# Patient Record
Sex: FEMALE | HISPANIC OR LATINO | Employment: FULL TIME | RURAL
[De-identification: names, ages, dates, MRNs, and addresses within clinical notes are randomized per-mention and may not be internally consistent; named-entity substitution may affect disease eponyms.]

---

## 2023-11-22 ENCOUNTER — OFFICE VISIT (OUTPATIENT)
Dept: FAMILY MEDICINE CLINIC | Facility: CLINIC | Age: 18
End: 2023-11-22
Payer: COMMERCIAL

## 2023-11-22 VITALS
BODY MASS INDEX: 40.97 KG/M2 | DIASTOLIC BLOOD PRESSURE: 82 MMHG | WEIGHT: 217 LBS | HEART RATE: 99 BPM | SYSTOLIC BLOOD PRESSURE: 128 MMHG | OXYGEN SATURATION: 100 % | HEIGHT: 61 IN

## 2023-11-22 DIAGNOSIS — G47.8 NON-RESTORATIVE SLEEP: Primary | ICD-10-CM

## 2023-11-22 DIAGNOSIS — R63.5 WEIGHT GAIN: ICD-10-CM

## 2023-11-22 DIAGNOSIS — R40.0 DAYTIME SOMNOLENCE: ICD-10-CM

## 2023-11-22 DIAGNOSIS — E66.01 MORBID (SEVERE) OBESITY DUE TO EXCESS CALORIES: ICD-10-CM

## 2023-11-22 DIAGNOSIS — R06.83 SNORING: ICD-10-CM

## 2023-11-22 PROCEDURE — 99203 OFFICE O/P NEW LOW 30 MIN: CPT | Performed by: INTERNAL MEDICINE

## 2023-11-22 NOTE — PROGRESS NOTES
Office Note     Name: Yessenia Gill    : 2005     MRN: [id number]     Chief Complaint  Establish Care (Pt is here to establish care today. She is here with her . )    Subjective     History of Present Illness:  Yessenia Gill is a 18 y.o. female who presents today for:    Est Care.    Not on birth control. Has never been on contraceptive. Has never actively tried to get pregnant.      Has monthly regular periods lasting 1 week.    Also concerned about weight.  Previously ha been able to lose a few pounds with calorie counting and walking but regained it and has not had success losing again.    Is also tried all the time, sleep is not restful, sleeps al the time, partner says she snore.      Review of Systems:   Review of Systems    Past Medical History: History reviewed. No pertinent past medical history.    Past Surgical History: History reviewed. No pertinent surgical history.    Family History:   Family History   Problem Relation Age of Onset    Cancer Mother     Diabetes Father        Social History:   Social History     Socioeconomic History    Marital status: Significant Other   Tobacco Use    Smoking status: Never     Passive exposure: Never    Smokeless tobacco: Never   Vaping Use    Vaping Use: Never used   Substance and Sexual Activity    Alcohol use: Not Currently    Drug use: Not Currently    Sexual activity: Yes     Partners: Male       Immunizations:   Immunization History   Administered Date(s) Administered    COVID-19 (PFIZER) Purple Cap Monovalent 2021, 2021    DTaP 2003, 2003, 2003, 2004, 2007    Flublok 18+yrs 10/19/2023    HPV Quadrivalent 2014, 2015    Hep A, 2 Dose 2006, 2007    Hep B / HiB 2003, 2003    Hep B, Adolescent or Pediatric 2003    Hepatitis B 2 Dose Vaccine Heplisav-B 10/27/2023    Hib (PRP-T) 2003, 2004    IPV 2003, 2003, 2003, 2004, 2004,  "08/27/2007    MMR 08/27/2004    MMRV 08/28/2007    Meningococcal Conjugate 05/30/2014    PEDS-Pneumococcal Conjugate (PCV7) 2003, 2003, 2003, 08/27/2004    Tdap 05/30/2014    Varicella 08/27/2004        Medications:   No current outpatient medications on file.    Allergies:   No Known Allergies    Objective     Vital Signs  /82   Pulse 99   Ht 154.9 cm (61\")   Wt 98.4 kg (217 lb)   SpO2 100%   BMI 41.00 kg/m²   Estimated body mass index is 41 kg/m² as calculated from the following:    Height as of this encounter: 154.9 cm (61\").    Weight as of this encounter: 98.4 kg (217 lb).    Class 3 Severe Obesity (BMI >=40). Obesity-related health conditions include the following: none. Obesity is newly identified. BMI is is above average; BMI management plan is completed. We discussed portion control and increasing exercise.      Physical Exam  Vitals and nursing note reviewed.   Constitutional:       Appearance: Normal appearance.   Cardiovascular:      Rate and Rhythm: Normal rate and regular rhythm.      Heart sounds: No murmur heard.     No friction rub. No gallop.   Pulmonary:      Effort: Pulmonary effort is normal.      Breath sounds: Normal breath sounds. No wheezing, rhonchi or rales.   Neurological:      Mental Status: She is alert.            Procedures     Assessment and Plan     1. Non-restorative sleep    - Lipid Panel  - CBC & Differential  - TSH Rfx On Abnormal To Free T4  - Hemoglobin A1c  - Home Sleep Study; Future    2. Snoring    - Lipid Panel  - CBC & Differential  - TSH Rfx On Abnormal To Free T4  - Hemoglobin A1c  - Home Sleep Study; Future    3. Daytime somnolence    - Lipid Panel  - CBC & Differential  - TSH Rfx On Abnormal To Free T4  - Hemoglobin A1c  - Home Sleep Study; Future    4. Weight gain    - Lipid Panel  - CBC & Differential  - TSH Rfx On Abnormal To Free T4  - Hemoglobin A1c  - Home Sleep Study; Future    5. Morbid (severe) obesity due to excess calories    - " Lipid Panel  - CBC & Differential  - TSH Rfx On Abnormal To Free T4  - Hemoglobin A1c  - Home Sleep Study; Future       Follow Up  Return if symptoms worsen or fail to improve.    Patient was advised to call the office or seek medical care if  any issues discussed during this visit worsen or persist or if new concerns arise        MD REJI [facility]ust[facility]  [address] [facility] [address] [phone]

## 2023-11-23 LAB
BASOPHILS # BLD AUTO: 0 X10E3/UL (ref 0–0.2)
BASOPHILS NFR BLD AUTO: 0 %
CHOLEST SERPL-MCNC: 158 MG/DL (ref 100–199)
EOSINOPHIL # BLD AUTO: 0.1 X10E3/UL (ref 0–0.4)
EOSINOPHIL NFR BLD AUTO: 1 %
ERYTHROCYTE [DISTWIDTH] IN BLOOD BY AUTOMATED COUNT: 13.9 % (ref 11.7–15.4)
HBA1C MFR BLD: 5.4 % (ref 4.8–5.6)
HCT VFR BLD AUTO: 38.9 % (ref 34–46.6)
HDLC SERPL-MCNC: 46 MG/DL
HGB BLD-MCNC: 12.9 G/DL (ref 11.1–15.9)
IMM GRANULOCYTES # BLD AUTO: 0 X10E3/UL (ref 0–0.1)
IMM GRANULOCYTES NFR BLD AUTO: 0 %
LDLC SERPL CALC-MCNC: 92 MG/DL (ref 0–99)
LYMPHOCYTES # BLD AUTO: 2.4 X10E3/UL (ref 0.7–3.1)
LYMPHOCYTES NFR BLD AUTO: 35 %
MCH RBC QN AUTO: 27.6 PG (ref 26.6–33)
MCHC RBC AUTO-ENTMCNC: 33.2 G/DL (ref 31.5–35.7)
MCV RBC AUTO: 83 FL (ref 79–97)
MONOCYTES # BLD AUTO: 0.5 X10E3/UL (ref 0.1–0.9)
MONOCYTES NFR BLD AUTO: 8 %
NEUTROPHILS # BLD AUTO: 3.9 X10E3/UL (ref 1.4–7)
NEUTROPHILS NFR BLD AUTO: 56 %
PLATELET # BLD AUTO: 282 X10E3/UL (ref 150–450)
RBC # BLD AUTO: 4.67 X10E6/UL (ref 3.77–5.28)
TRIGL SERPL-MCNC: 112 MG/DL (ref 0–149)
TSH SERPL DL<=0.005 MIU/L-ACNC: 1.51 UIU/ML (ref 0.45–4.5)
VLDLC SERPL CALC-MCNC: 20 MG/DL (ref 5–40)
WBC # BLD AUTO: 6.9 X10E3/UL (ref 3.4–10.8)

## 2023-11-27 DIAGNOSIS — E66.01 MORBID (SEVERE) OBESITY DUE TO EXCESS CALORIES: Primary | ICD-10-CM

## 2023-12-07 ENCOUNTER — OFFICE VISIT (OUTPATIENT)
Dept: FAMILY MEDICINE CLINIC | Facility: CLINIC | Age: 18
End: 2023-12-07
Payer: COMMERCIAL

## 2023-12-07 VITALS
DIASTOLIC BLOOD PRESSURE: 74 MMHG | HEART RATE: 89 BPM | HEIGHT: 61 IN | SYSTOLIC BLOOD PRESSURE: 110 MMHG | OXYGEN SATURATION: 98 % | BODY MASS INDEX: 39.65 KG/M2 | WEIGHT: 210 LBS

## 2023-12-07 DIAGNOSIS — R05.1 ACUTE COUGH: Primary | ICD-10-CM

## 2023-12-07 LAB
EXPIRATION DATE: NORMAL
FLUAV AG UPPER RESP QL IA.RAPID: NOT DETECTED
FLUBV AG UPPER RESP QL IA.RAPID: NOT DETECTED
INTERNAL CONTROL: NORMAL
Lab: NORMAL
SARS-COV-2 AG UPPER RESP QL IA.RAPID: NOT DETECTED

## 2023-12-07 NOTE — PROGRESS NOTES
"Chief Complaint  Headache (Headache for 3 days coughing started yesterday )    Subjective          Yessenia Gill presents to [facility]  History of Present Illness  Pt had had intermittent headaches x 3 days. She has had dry cough but denies fever, chills, or myalgias. She denies sinus pressure or congestion. She denies N/V/D.   Headache      Objective   Vital Signs:   /74   Pulse 89   Ht 154.9 cm (61\")   Wt 95.3 kg (210 lb)   SpO2 98%   BMI 39.68 kg/m²     Body mass index is 39.68 kg/m².    Review of Systems   Constitutional:  Negative for fatigue and fever.   Respiratory:  Positive for cough. Negative for shortness of breath.    Cardiovascular:  Negative for chest pain, palpitations and leg swelling.   Neurological:  Negative for syncope.   Psychiatric/Behavioral:  The patient is not nervous/anxious.         No current outpatient medications on file.      Allergies: Patient has no known allergies.    Physical Exam  Constitutional:       Appearance: Normal appearance.   HENT:      Right Ear: Tympanic membrane and ear canal normal.      Left Ear: Tympanic membrane and ear canal normal.      Nose: Nose normal.      Mouth/Throat:      Pharynx: No posterior oropharyngeal erythema.   Eyes:      Extraocular Movements: Extraocular movements intact.      Conjunctiva/sclera: Conjunctivae normal.      Pupils: Pupils are equal, round, and reactive to light.   Cardiovascular:      Rate and Rhythm: Normal rate and regular rhythm.      Heart sounds: Normal heart sounds.   Pulmonary:      Effort: Pulmonary effort is normal. No accessory muscle usage.      Breath sounds: Normal breath sounds.   Lymphadenopathy:      Cervical: No cervical adenopathy.   Skin:     General: Skin is warm and dry.   Neurological:      General: No focal deficit present.      Mental Status: She is alert.   Psychiatric:         Mood and Affect: Mood normal.         Behavior: Behavior normal.         Thought " Content: Thought content normal.         Judgment: Judgment normal.          Result Review :                   Assessment and Plan    Diagnoses and all orders for this visit:    1. Acute cough (Primary)  Comments:  Increase fluids. Tylenol/Motrin PRN. Robitussin PRN cough. RTC if not improving in 1 week.  Orders:  -     POCT SARS-CoV-2 + Flu Antigen QI                Follow Up   Return in about 1 week (around 12/14/2023) for if not improving or sooner if symptoms worsen.  Patient was given instructions and counseling regarding her condition or for health maintenance advice. Please see specific information pulled into the AVS if appropriate.     She Castañeda, APRN

## 2023-12-08 DIAGNOSIS — N94.6 DYSMENORRHEA: Primary | ICD-10-CM

## 2023-12-12 DIAGNOSIS — N94.6 DYSMENORRHEA: Primary | ICD-10-CM

## 2024-01-03 DIAGNOSIS — Z00.00 EXAMINATION: ICD-10-CM

## 2024-01-03 DIAGNOSIS — Z00.00 EXAMINATION: Primary | ICD-10-CM

## 2024-01-03 PROCEDURE — 36415 COLL VENOUS BLD VENIPUNCTURE: CPT | Performed by: PHYSICIAN ASSISTANT

## 2024-01-04 LAB — HBV SURFACE AG SERPL QL IA: NEGATIVE

## 2024-01-10 ENCOUNTER — OFFICE VISIT (OUTPATIENT)
Dept: FAMILY MEDICINE CLINIC | Facility: CLINIC | Age: 19
End: 2024-01-10
Payer: COMMERCIAL

## 2024-01-10 VITALS
DIASTOLIC BLOOD PRESSURE: 86 MMHG | HEART RATE: 112 BPM | SYSTOLIC BLOOD PRESSURE: 132 MMHG | TEMPERATURE: 98.8 F | OXYGEN SATURATION: 98 %

## 2024-01-10 DIAGNOSIS — R05.9 COUGH, UNSPECIFIED TYPE: ICD-10-CM

## 2024-01-10 DIAGNOSIS — J10.1 INFLUENZA A: Primary | ICD-10-CM

## 2024-01-10 LAB
EXPIRATION DATE: ABNORMAL
FLUAV AG UPPER RESP QL IA.RAPID: DETECTED
FLUBV AG UPPER RESP QL IA.RAPID: NOT DETECTED
INTERNAL CONTROL: ABNORMAL
Lab: ABNORMAL
SARS-COV-2 AG UPPER RESP QL IA.RAPID: NOT DETECTED

## 2024-01-10 PROCEDURE — 99213 OFFICE O/P EST LOW 20 MIN: CPT | Performed by: INTERNAL MEDICINE

## 2024-01-10 PROCEDURE — 87428 SARSCOV & INF VIR A&B AG IA: CPT | Performed by: INTERNAL MEDICINE

## 2024-01-10 RX ORDER — BENZONATATE 100 MG/1
100 CAPSULE ORAL 3 TIMES DAILY PRN
Qty: 21 CAPSULE | Refills: 0 | Status: SHIPPED | OUTPATIENT
Start: 2024-01-10

## 2024-01-10 NOTE — PROGRESS NOTES
Office Note     Name: Yessenia Gill    : 2005     MRN: [id number]     Chief Complaint  Cough (Pt is here for cough, congestion, drainage and sinus pressure. )    Subjective     History of Present Illness:  Yessenia Gill is a 18 y.o. female who presents today for:    Cough cngestion sinus pressure and sore trhat, then about 4-5 days ago started feeling much worse.  Has had chills      Review of Systems:   Review of Systems   Gastrointestinal:  Negative for diarrhea and vomiting.       Past Medical History: History reviewed. No pertinent past medical history.    Past Surgical History: History reviewed. No pertinent surgical history.    Family History:   Family History   Problem Relation Age of Onset    Cancer Mother     Diabetes Father        Social History:   Social History     Socioeconomic History    Marital status: Significant Other   Tobacco Use    Smoking status: Never     Passive exposure: Never    Smokeless tobacco: Never   Vaping Use    Vaping Use: Never used   Substance and Sexual Activity    Alcohol use: Not Currently    Drug use: Not Currently    Sexual activity: Yes     Partners: Male       Immunizations:   Immunization History   Administered Date(s) Administered    COVID-19 (PFIZER) Purple Cap Monovalent 2021, 2021    DTaP 2003, 2003, 2003, 2004, 2007    Flublok 18+yrs 10/19/2023    HPV Quadrivalent 2014, 2015    Hep A, 2 Dose 2006, 2007    Hep B / HiB 2003, 2003    Hep B, Adolescent or Pediatric 2003    Hepatitis B 2 Dose Vaccine Heplisav-B 10/27/2023    Hib (PRP-T) 2003, 2004    IPV 2003, 2003, 2003, 2004, 2004, 2007    MMR 2004    MMRV 2007    Meningococcal Conjugate 2014    PEDS-Pneumococcal Conjugate (PCV7) 2003, 2003, 2003, 2004    Tdap 2014    Varicella 2004        Medications:     Current Outpatient  "Medications:     benzonatate (Tessalon Perles) 100 MG capsule, Take 1 capsule by mouth 3 (Three) Times a Day As Needed for Cough., Disp: 21 capsule, Rfl: 0    Allergies:   No Known Allergies    Objective     Vital Signs  /86   Pulse 112   Temp 98.8 °F (37.1 °C) (Oral)   SpO2 98%   Estimated body mass index is 39.68 kg/m² as calculated from the following:    Height as of 12/7/23: 154.9 cm (61\").    Weight as of 12/7/23: 95.3 kg (210 lb).            Physical Exam  Vitals and nursing note reviewed.   Constitutional:       General: She is not in acute distress.     Appearance: Normal appearance.   HENT:      Right Ear: Tympanic membrane normal.      Left Ear: Tympanic membrane normal.      Nose: Rhinorrhea present.      Mouth/Throat:      Pharynx: No oropharyngeal exudate or posterior oropharyngeal erythema.   Eyes:      Conjunctiva/sclera: Conjunctivae normal.   Cardiovascular:      Rate and Rhythm: Normal rate and regular rhythm.      Heart sounds: Normal heart sounds.   Pulmonary:      Effort: Pulmonary effort is normal.      Breath sounds: Normal breath sounds. No wheezing, rhonchi or rales.   Neurological:      Mental Status: She is alert.            Procedures     Assessment and Plan     1. Influenza A  Discussed influenza diagnosis today, including common symptoms.  Recommended increased p.o. fluid intake.  Recommended Ibuprofen and Tylenol for fever or body aches and to avoid aspirin and salicylate products.  Patient or caregiver should call the office, return to clinic or seek further care if symptoms worsen or persist. Cannot return to school/work until fever has been resolved for 24-48 hours. Risks and benefits of tamiflu treatment discussed, including potential for shorter time to resolution and possible decrease in complications from influenza, medication not prescribed due to time of diagnosis being outside therapeutic window. Patient is advised to contact the office or be re-evaluated if symptoms " worsen or fail to improve.     Start benzonotate 100 MG po [id number] PRN for cough    2. Cough, unspecified type    - POCT SARS-CoV-2 + Flu Antigen QI       Follow Up  Return if symptoms worsen or fail to improve.    Patient was advised to call the office or seek medical care if  any issues discussed during this visit worsen or persist or if new concerns arise        MD REJI Huston [facility]  [address] [place] [id number] [phone]

## 2024-02-09 ENCOUNTER — CLINICAL SUPPORT (OUTPATIENT)
Dept: FAMILY MEDICINE CLINIC | Facility: CLINIC | Age: 19
End: 2024-02-09
Payer: COMMERCIAL

## 2024-02-09 DIAGNOSIS — Z23 ENCOUNTER FOR IMMUNIZATION: Primary | ICD-10-CM

## 2024-03-18 ENCOUNTER — OFFICE VISIT (OUTPATIENT)
Dept: FAMILY MEDICINE CLINIC | Facility: CLINIC | Age: 19
End: 2024-03-18
Payer: COMMERCIAL

## 2024-03-18 VITALS
BODY MASS INDEX: 39.68 KG/M2 | HEART RATE: 99 BPM | SYSTOLIC BLOOD PRESSURE: 120 MMHG | HEIGHT: 61 IN | DIASTOLIC BLOOD PRESSURE: 70 MMHG | TEMPERATURE: 99.1 F | OXYGEN SATURATION: 99 %

## 2024-03-18 DIAGNOSIS — U07.1 COVID-19: Primary | ICD-10-CM

## 2024-03-18 DIAGNOSIS — J02.9 SORE THROAT: ICD-10-CM

## 2024-03-18 LAB
EXPIRATION DATE: ABNORMAL
FLUAV AG UPPER RESP QL IA.RAPID: NOT DETECTED
FLUBV AG UPPER RESP QL IA.RAPID: NOT DETECTED
INTERNAL CONTROL: ABNORMAL
Lab: ABNORMAL
SARS-COV-2 AG UPPER RESP QL IA.RAPID: DETECTED

## 2024-03-18 PROCEDURE — 87428 SARSCOV & INF VIR A&B AG IA: CPT | Performed by: NURSE PRACTITIONER

## 2024-03-18 PROCEDURE — 99213 OFFICE O/P EST LOW 20 MIN: CPT | Performed by: NURSE PRACTITIONER

## 2024-03-18 RX ORDER — BROMPHENIRAMINE MALEATE, PSEUDOEPHEDRINE HYDROCHLORIDE, AND DEXTROMETHORPHAN HYDROBROMIDE 2; 30; 10 MG/5ML; MG/5ML; MG/5ML
5 SYRUP ORAL 4 TIMES DAILY PRN
Qty: 120 ML | Refills: 0 | Status: SHIPPED | OUTPATIENT
Start: 2024-03-18

## 2024-03-18 NOTE — PROGRESS NOTES
"Chief Complaint  Cough (Cough, runny nose, itchy nose, itchy eyes, sinus pressure since Friday afternoon )    Subjective          Yessenia Gill presents to [facility]  History of Present Illness  Pt has had cough, congestion, ear pressure, and chills since Friday.   Cough  Associated symptoms include chills and ear pain. Pertinent negatives include no chest pain, fever or shortness of breath.       Objective   Vital Signs:   /70   Pulse 99   Temp 99.1 °F (37.3 °C) (Oral)   Ht 154.9 cm (61\")   SpO2 99%   BMI 39.68 kg/m²     Body mass index is 39.68 kg/m².    Review of Systems   Constitutional:  Positive for chills. Negative for fatigue and fever.   HENT:  Positive for congestion and ear pain.    Respiratory:  Positive for cough. Negative for shortness of breath.    Cardiovascular:  Negative for chest pain, palpitations and leg swelling.   Neurological:  Negative for syncope.   Psychiatric/Behavioral:  The patient is not nervous/anxious.           Current Outpatient Medications:     brompheniramine-pseudoephedrine-DM 30-2-10 MG/5ML syrup, Take 5 mL by mouth 4 (Four) Times a Day As Needed for Cough or Congestion., Disp: 120 mL, Rfl: 0      Allergies: Patient has no known allergies.    Physical Exam  Constitutional:       Appearance: Normal appearance.   HENT:      Head: Normocephalic.   Eyes:      Conjunctiva/sclera: Conjunctivae normal.      Pupils: Pupils are equal, round, and reactive to light.   Cardiovascular:      Rate and Rhythm: Normal rate and regular rhythm.      Heart sounds: Normal heart sounds.   Pulmonary:      Effort: Pulmonary effort is normal.      Breath sounds: Normal breath sounds.   Abdominal:      Tenderness: There is no abdominal tenderness.   Musculoskeletal:         General: Normal range of motion.   Skin:     General: Skin is warm and dry.      Capillary Refill: Capillary refill takes less than 2 seconds.   Neurological:      General: No focal deficit " present.      Mental Status: She is alert and oriented to person, place, and time.   Psychiatric:         Mood and Affect: Mood normal.         Behavior: Behavior normal.         Thought Content: Thought content normal.         Judgment: Judgment normal.          Result Review :                   Assessment and Plan    Diagnoses and all orders for this visit:    1. COVID-19 (Primary)  Comments:  Increase fluids. Tylenol/Motrin PRN. Bromfed DM PRN. RTC if not improving in 1 week.  Orders:  -     brompheniramine-pseudoephedrine-DM 30-2-10 MG/5ML syrup; Take 5 mL by mouth 4 (Four) Times a Day As Needed for Cough or Congestion.  Dispense: 120 mL; Refill: 0    2. Sore throat  -     POCT SARS-CoV-2 Antigen QI + Flu                Follow Up   Return in about 1 week (around 3/25/2024) for if not improving or sooner if symptoms worsen.  Patient was given instructions and counseling regarding her condition or for health maintenance advice. Please see specific information pulled into the AVS if appropriate.     LEONARDO Cates

## 2024-04-03 ENCOUNTER — OFFICE VISIT (OUTPATIENT)
Dept: FAMILY MEDICINE CLINIC | Facility: CLINIC | Age: 19
End: 2024-04-03
Payer: COMMERCIAL

## 2024-04-03 VITALS
BODY MASS INDEX: 44.56 KG/M2 | SYSTOLIC BLOOD PRESSURE: 138 MMHG | WEIGHT: 236 LBS | HEART RATE: 118 BPM | HEIGHT: 61 IN | OXYGEN SATURATION: 98 % | DIASTOLIC BLOOD PRESSURE: 88 MMHG

## 2024-04-03 DIAGNOSIS — Z00.00 ANNUAL PHYSICAL EXAM: Primary | ICD-10-CM

## 2024-04-03 DIAGNOSIS — Z11.59 ENCOUNTER FOR HEPATITIS C SCREENING TEST FOR LOW RISK PATIENT: ICD-10-CM

## 2024-04-03 DIAGNOSIS — F41.1 GENERALIZED ANXIETY DISORDER WITH PANIC ATTACKS: Chronic | ICD-10-CM

## 2024-04-03 DIAGNOSIS — Z13.21 ENCOUNTER FOR VITAMIN DEFICIENCY SCREENING: ICD-10-CM

## 2024-04-03 DIAGNOSIS — Z13.1 SCREENING FOR DIABETES MELLITUS (DM): ICD-10-CM

## 2024-04-03 DIAGNOSIS — Z13.220 SCREENING FOR LIPID DISORDERS: ICD-10-CM

## 2024-04-03 DIAGNOSIS — F41.0 GENERALIZED ANXIETY DISORDER WITH PANIC ATTACKS: Chronic | ICD-10-CM

## 2024-04-03 RX ORDER — HYDROXYZINE HYDROCHLORIDE 25 MG/1
25 TABLET, FILM COATED ORAL 3 TIMES DAILY PRN
Qty: 30 TABLET | Refills: 1 | Status: SHIPPED | OUTPATIENT
Start: 2024-04-03

## 2024-04-03 RX ORDER — ESCITALOPRAM OXALATE 10 MG/1
10 TABLET ORAL DAILY
Qty: 30 TABLET | Refills: 0 | Status: SHIPPED | OUTPATIENT
Start: 2024-04-03
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
## 2024-04-03 NOTE — ASSESSMENT & PLAN NOTE
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
## 2024-04-04 ENCOUNTER — LAB (OUTPATIENT)
Dept: FAMILY MEDICINE CLINIC | Facility: CLINIC | Age: 19
End: 2024-04-04
Payer: COMMERCIAL

## 2024-04-05 LAB
25(OH)D3+25(OH)D2 SERPL-MCNC: 7.3 NG/ML (ref 30–100)
ALBUMIN SERPL-MCNC: 4.1 G/DL (ref 4–5)
ALBUMIN/GLOB SERPL: 1.4 {RATIO} (ref 1.2–2.2)
ALP SERPL-CCNC: 106 IU/L (ref 44–121)
ALT SERPL-CCNC: 21 IU/L (ref 0–32)
AST SERPL-CCNC: 17 IU/L (ref 0–40)
BASOPHILS # BLD AUTO: 0 X10E3/UL (ref 0–0.2)
BASOPHILS NFR BLD AUTO: 1 %
BILIRUB SERPL-MCNC: 0.2 MG/DL (ref 0–1.2)
BUN SERPL-MCNC: 11 MG/DL (ref 6–20)
BUN/CREAT SERPL: 20 (ref 9–23)
CALCIUM SERPL-MCNC: 9.1 MG/DL (ref 8.7–10.2)
CHLORIDE SERPL-SCNC: 103 MMOL/L (ref 96–106)
CHOLEST SERPL-MCNC: 158 MG/DL (ref 100–199)
CO2 SERPL-SCNC: 21 MMOL/L (ref 20–29)
CREAT SERPL-MCNC: 0.55 MG/DL (ref 0.57–1)
EGFRCR SERPLBLD CKD-EPI 2021: 134 ML/MIN/1.73
EOSINOPHIL # BLD AUTO: 0.1 X10E3/UL (ref 0–0.4)
EOSINOPHIL NFR BLD AUTO: 2 %
ERYTHROCYTE [DISTWIDTH] IN BLOOD BY AUTOMATED COUNT: 14.1 % (ref 11.7–15.4)
FOLATE SERPL-MCNC: 8.6 NG/ML
GLOBULIN SER CALC-MCNC: 3 G/DL (ref 1.5–4.5)
GLUCOSE SERPL-MCNC: 96 MG/DL (ref 70–99)
HBA1C MFR BLD: 5.7 % (ref 4.8–5.6)
HCT VFR BLD AUTO: 36.6 % (ref 34–46.6)
HCV IGG SERPL QL IA: NON REACTIVE
HDLC SERPL-MCNC: 48 MG/DL
HGB BLD-MCNC: 11.9 G/DL (ref 11.1–15.9)
IMM GRANULOCYTES # BLD AUTO: 0 X10E3/UL (ref 0–0.1)
IMM GRANULOCYTES NFR BLD AUTO: 0 %
LDLC SERPL CALC-MCNC: 95 MG/DL (ref 0–99)
LYMPHOCYTES # BLD AUTO: 2.3 X10E3/UL (ref 0.7–3.1)
LYMPHOCYTES NFR BLD AUTO: 38 %
MCH RBC QN AUTO: 25.9 PG (ref 26.6–33)
MCHC RBC AUTO-ENTMCNC: 32.5 G/DL (ref 31.5–35.7)
MCV RBC AUTO: 80 FL (ref 79–97)
MONOCYTES # BLD AUTO: 0.4 X10E3/UL (ref 0.1–0.9)
MONOCYTES NFR BLD AUTO: 6 %
NEUTROPHILS # BLD AUTO: 3.2 X10E3/UL (ref 1.4–7)
NEUTROPHILS NFR BLD AUTO: 53 %
PLATELET # BLD AUTO: 282 X10E3/UL (ref 150–450)
POTASSIUM SERPL-SCNC: 4.3 MMOL/L (ref 3.5–5.2)
PROT SERPL-MCNC: 7.1 G/DL (ref 6–8.5)
RBC # BLD AUTO: 4.6 X10E6/UL (ref 3.77–5.28)
SODIUM SERPL-SCNC: 137 MMOL/L (ref 134–144)
T4 FREE SERPL-MCNC: 1.35 NG/DL (ref 0.82–1.77)
TRIGL SERPL-MCNC: 76 MG/DL (ref 0–149)
TSH SERPL DL<=0.005 MIU/L-ACNC: 1.3 UIU/ML (ref 0.45–4.5)
VIT B12 SERPL-MCNC: 361 PG/ML (ref 232–1245)
VLDLC SERPL CALC-MCNC: 15 MG/DL (ref 5–40)
WBC # BLD AUTO: 6.1 X10E3/UL (ref 3.4–10.8)

## 2024-04-07 DIAGNOSIS — E55.9 VITAMIN D DEFICIENCY: Primary | ICD-10-CM

## 2024-04-12 ENCOUNTER — PATIENT ROUNDING (BHMG ONLY) (OUTPATIENT)
Dept: OBSTETRICS AND GYNECOLOGY | Facility: CLINIC | Age: 19
End: 2024-04-12
Payer: COMMERCIAL

## 2024-04-12 ENCOUNTER — OFFICE VISIT (OUTPATIENT)
Dept: OBSTETRICS AND GYNECOLOGY | Facility: CLINIC | Age: 19
End: 2024-04-12
Payer: COMMERCIAL

## 2024-04-12 VITALS
WEIGHT: 236 LBS | HEIGHT: 61 IN | BODY MASS INDEX: 44.56 KG/M2 | DIASTOLIC BLOOD PRESSURE: 82 MMHG | SYSTOLIC BLOOD PRESSURE: 110 MMHG

## 2024-04-12 DIAGNOSIS — Z30.09 ENCOUNTER FOR OTHER GENERAL COUNSELING OR ADVICE ON CONTRACEPTION: ICD-10-CM

## 2024-04-12 DIAGNOSIS — E66.01 MORBID OBESITY WITH BMI OF 40.0-44.9, ADULT: ICD-10-CM

## 2024-04-12 DIAGNOSIS — Z01.419 WOMEN'S ANNUAL ROUTINE GYNECOLOGICAL EXAMINATION: ICD-10-CM

## 2024-04-12 DIAGNOSIS — N94.6 DYSMENORRHEA: ICD-10-CM

## 2024-04-12 DIAGNOSIS — N92.0 MENORRHAGIA WITH REGULAR CYCLE: Primary | ICD-10-CM

## 2024-04-12 DIAGNOSIS — Z01.419 PAP TEST, AS PART OF ROUTINE GYNECOLOGICAL EXAMINATION: ICD-10-CM

## 2024-04-12 NOTE — PROGRESS NOTES
Gynecologic Annual Exam Note        Gynecologic Exam (New patient) and Menorrhagia        Subjective     HPI  Yessenia Gill is a 19 y.o.  female who presents for annual well woman exam as a new patient. . Patient's last menstrual period was 2024 (exact date). Her periods occur every 28 days, lasting 6 days.  The flow is moderate to heavy. She reports dysmenorrhea is moderate occurring first 1-2 days of flow. Marital Status: engaged.  She is sexually active. She has not had new partners.. STD testing recommendations have been explained to the patient and she does desire STD testing.    The patient would like to discuss the following complaints today: heavy cycles.  She is not interested in hormonal birth control.    Additional OB/GYN History   contraceptive methods: None  Desires to:  wants to discuss  contraception if necessary  Thromboembolic Disease: none  History of migraines: no  Age of menarche: 9    History of STD: no    Last Pap : N/A.        Gardasil status: had 2  Family history of uterine, colon, breast, or ovarian cancer: yes - maternal grandmother had breast cancer and mom had bladder cancer  Performs monthly Self-Breast Exam: no  Exercises Regularly:no  Feelings of Anxiety or Depression: yes - anxiety  Tobacco Usage?: No       Current Outpatient Medications:     escitalopram (Lexapro) 10 MG tablet, Take 1 tablet by mouth Daily., Disp: 30 tablet, Rfl: 0    hydrOXYzine (ATARAX) 25 MG tablet, Take 1 tablet by mouth 3 (Three) Times a Day As Needed for Anxiety., Disp: 30 tablet, Rfl: 1    vitamin D3 (Vitamin D) 125 MCG (5000 UT) capsule capsule, Take 1 capsule by mouth Daily., Disp: 30 capsule, Rfl: 11     Patient denies the need for medication refills today.    OB History          0    Para   0    Term   0       0    AB   0    Living   0         SAB   0    IAB   0    Ectopic   0    Molar   0    Multiple   0    Live Births   0                Health Maintenance   Topic Date  "Due    Annual Gynecologic Pelvic and Breast Exam  Never done    COVID-19 Vaccine (3 - 2023-24 season) 09/01/2023    CHLAMYDIA SCREENING  Never done    PAP SMEAR  Never done    TDAP/TD VACCINES (2 - Td or Tdap) 05/30/2024    INFLUENZA VACCINE  08/01/2024    BMI FOLLOWUP  11/27/2024    ANNUAL PHYSICAL  04/03/2025    HEPATITIS C SCREENING  Completed    HPV VACCINES  Completed    Pneumococcal Vaccine 0-64  Aged Out    MENINGOCOCCAL VACCINE  Aged Out       Past Medical History:   Diagnosis Date    Anxiety         History reviewed. No pertinent surgical history.    The additional following portions of the patient's history were reviewed and updated as appropriate: .    Review of Systems   Constitutional: Negative.    HENT: Negative.     Eyes: Negative.    Respiratory: Negative.     Cardiovascular: Negative.    Gastrointestinal: Negative.    Endocrine: Negative.    Genitourinary: Negative.    Musculoskeletal: Negative.    Skin: Negative.    Allergic/Immunologic: Negative.    Neurological: Negative.    Hematological: Negative.    Psychiatric/Behavioral: Negative.           I have reviewed and agree with the HPI, ROS, and historical information as entered above. Peggy Golden, APRN          Objective   /82   Ht 154.9 cm (61\")   Wt 107 kg (236 lb)   LMP 03/27/2024 (Exact Date)   BMI 44.59 kg/m²     Physical Exam  Vitals and nursing note reviewed. Exam conducted with a chaperone present.   Constitutional:       General: She is not in acute distress.     Appearance: Normal appearance. She is well-developed. She is obese. She is not ill-appearing.   Neck:      Thyroid: No thyroid mass or thyromegaly.   Pulmonary:      Effort: Pulmonary effort is normal. No respiratory distress or retractions.   Chest:      Chest wall: No mass.   Breasts:     Right: Normal. No mass, nipple discharge, skin change or tenderness.      Left: Normal. No mass, nipple discharge, skin change or tenderness.   Abdominal:      General: There is no " distension.      Palpations: Abdomen is soft. Abdomen is not rigid. There is no mass.      Tenderness: There is no abdominal tenderness. There is no guarding or rebound.      Hernia: No hernia is present. There is no hernia in the left inguinal area.   Genitourinary:     General: Normal vulva.      Labia:         Right: No rash, tenderness or lesion.         Left: No rash, tenderness or lesion.       Vagina: Normal. No vaginal discharge or lesions.      Cervix: Normal.      Uterus: Normal. Not enlarged, not fixed and not tender.       Adnexa: Right adnexa normal and left adnexa normal.        Right: No mass or tenderness.          Left: No mass or tenderness.        Rectum: No external hemorrhoid.   Musculoskeletal:      Cervical back: No muscular tenderness.   Skin:     General: Skin is warm and dry.   Neurological:      Mental Status: She is alert and oriented to person, place, and time.   Psychiatric:         Mood and Affect: Mood normal.         Behavior: Behavior normal.            Assessment and Plan    Problem List Items Addressed This Visit    None  Visit Diagnoses       Menorrhagia with regular cycle    -  Primary    Relevant Orders    US Non-ob Transvaginal    Dysmenorrhea        Relevant Orders    US Non-ob Transvaginal    Pap test, as part of routine gynecological examination        Relevant Orders    LIQUID-BASED PAP SMEAR WITH HPV GENOTYPING IF ASCUS (ANGELITA,[facility],MAD)    Women's annual routine gynecological examination        Encounter for other general counseling or advice on contraception        Morbid obesity with BMI of 40.0-44.9, adult                GYN annual well woman exam.   Reviewed pap guidelines.   Reviewed monthly self breast exams.  Instructed to call with lumps, pain, or breast discharge.    Reviewed BMI and weight loss as preventative health measures.   Reviewed exercise as a preventative health measures.   Reccommended Flu Vaccine in Fall of each year.  RTC in 1 year or PRN with  problems  Return in about 2 weeks (around 4/26/2024) for US FU.  Reviewed with pt options, including OCPs, patch, Nuvaring, Lysteda, IUDs..  Risks, benefits, side effects, and correct use of each reviewed.  Info given.     Peggy Golden, APRN  04/12/2024

## 2024-04-12 NOTE — PROGRESS NOTES
April 12, 2024    Hello, may I speak with Yessenia Gill?    My name is ELIZABETH      I am  with [facility]   [facility]  [address]  [facility] [address] [phone].    Before we get started may I verify your date of birth? 2005    I am calling to officially welcome you to our practice and ask about your recent visit. Is this a good time to talk? yes    Tell me about your visit with us. What things went well?  EVERYTHING WENT GREAT       We're always looking for ways to make our patients' experiences even better. Do you have recommendations on ways we may improve?  no    Overall were you satisfied with your first visit to our practice? yes       I appreciate you taking the time to speak with me today. Is there anything else I can do for you? no      Thank you, and have a great day.

## 2024-04-18 LAB — REF LAB TEST METHOD: NORMAL

## 2024-04-26 ENCOUNTER — OFFICE VISIT (OUTPATIENT)
Dept: OBSTETRICS AND GYNECOLOGY | Facility: CLINIC | Age: 19
End: 2024-04-26
Payer: COMMERCIAL

## 2024-04-26 ENCOUNTER — OFFICE VISIT (OUTPATIENT)
Dept: FAMILY MEDICINE CLINIC | Facility: CLINIC | Age: 19
End: 2024-04-26
Payer: COMMERCIAL

## 2024-04-26 VITALS
DIASTOLIC BLOOD PRESSURE: 68 MMHG | HEIGHT: 61 IN | SYSTOLIC BLOOD PRESSURE: 124 MMHG | BODY MASS INDEX: 45.12 KG/M2 | WEIGHT: 239 LBS

## 2024-04-26 VITALS
OXYGEN SATURATION: 98 % | WEIGHT: 237 LBS | DIASTOLIC BLOOD PRESSURE: 76 MMHG | SYSTOLIC BLOOD PRESSURE: 124 MMHG | BODY MASS INDEX: 44.75 KG/M2 | HEART RATE: 101 BPM | HEIGHT: 61 IN

## 2024-04-26 DIAGNOSIS — N94.6 DYSMENORRHEA: ICD-10-CM

## 2024-04-26 DIAGNOSIS — F41.1 GENERALIZED ANXIETY DISORDER WITH PANIC ATTACKS: Primary | Chronic | ICD-10-CM

## 2024-04-26 DIAGNOSIS — E66.01 MORBID (SEVERE) OBESITY DUE TO EXCESS CALORIES: ICD-10-CM

## 2024-04-26 DIAGNOSIS — Z32.00 ENCOUNTER FOR PREGNANCY TEST, RESULT UNKNOWN: Primary | ICD-10-CM

## 2024-04-26 DIAGNOSIS — F41.0 GENERALIZED ANXIETY DISORDER WITH PANIC ATTACKS: Primary | Chronic | ICD-10-CM

## 2024-04-26 DIAGNOSIS — Z79.899 CONTROLLED SUBSTANCE AGREEMENT SIGNED: ICD-10-CM

## 2024-04-26 DIAGNOSIS — N92.0 MENORRHAGIA WITH REGULAR CYCLE: ICD-10-CM

## 2024-04-26 LAB
AMPHET+METHAMPHET UR QL: NEGATIVE
AMPHETAMINE INTERNAL CONTROL: NORMAL
AMPHETAMINES UR QL: NEGATIVE
B-HCG UR QL: NEGATIVE
BARBITURATE INTERNAL CONTROL: NORMAL
BARBITURATES UR QL SCN: NEGATIVE
BENZODIAZ UR QL SCN: NEGATIVE
BENZODIAZEPINE INTERNAL CONTROL: NORMAL
BUPRENORPHINE INTERNAL CONTROL: NORMAL
BUPRENORPHINE SERPL-MCNC: NEGATIVE NG/ML
CANNABINOIDS SERPL QL: NEGATIVE
COCAINE INTERNAL CONTROL: NORMAL
COCAINE UR QL: NEGATIVE
EXPIRATION DATE: NORMAL
EXPIRATION DATE: NORMAL
INTERNAL NEGATIVE CONTROL: NORMAL
INTERNAL POSITIVE CONTROL: NORMAL
Lab: NORMAL
Lab: NORMAL
MDMA (ECSTASY) INTERNAL CONTROL: NORMAL
MDMA UR QL SCN: NEGATIVE
METHADONE INTERNAL CONTROL: NORMAL
METHADONE UR QL SCN: NEGATIVE
METHAMPHETAMINE INTERNAL CONTROL: NORMAL
OPIATES INTERNAL CONTROL: NORMAL
OPIATES UR QL: NEGATIVE
OXYCODONE INTERNAL CONTROL: NORMAL
OXYCODONE UR QL SCN: NEGATIVE
PCP UR QL SCN: NEGATIVE
PHENCYCLIDINE INTERNAL CONTROL: NORMAL
THC INTERNAL CONTROL: NORMAL

## 2024-04-26 RX ORDER — PHENTERMINE HYDROCHLORIDE 37.5 MG/1
37.5 CAPSULE ORAL EVERY MORNING
Qty: 30 CAPSULE | Refills: 0 | Status: SHIPPED | OUTPATIENT
Start: 2024-04-26

## 2024-04-26 RX ORDER — NAPROXEN SODIUM 550 MG/1
TABLET ORAL
Qty: 60 TABLET | Refills: 1 | Status: SHIPPED | OUTPATIENT
Start: 2024-04-26

## 2024-04-26 RX ORDER — ESCITALOPRAM OXALATE 10 MG/1
10 TABLET ORAL DAILY
Qty: 90 TABLET | Refills: 1 | Status: SHIPPED | OUTPATIENT
Start: 2024-04-26

## 2024-04-26 NOTE — ASSESSMENT & PLAN NOTE
Psychological condition is improving with treatment.  Continue current treatment regimen.  Regular aerobic exercise.  Psychological condition  will be reassessed in 6 months.    Patient reports she is doing very well, anxiety is well-controlled since starting Lexapro. She is very exctied, has seen significant improvements in her daily life and job performance. She denies need to increase dose, wants to continue at current dose. Refills provided

## 2024-04-26 NOTE — PROGRESS NOTES
"    Office Note     Name: Yessenia Gill    : 2005     MRN: [id number]     Chief Complaint  Anxiety (Med recheck )    Subjective     History of Present Illness:  Yessenia Gill is a 19 y.o. female who presents today for follow-up on anxiety medication. She would like to discuss and initiate weigh tloss medication as well.       Objective     Past Medical History:   Diagnosis Date    Anxiety      History reviewed. No pertinent surgical history.  Family History   Problem Relation Age of Onset    Diabetes Father     Cancer Mother        Vital Signs  /76   Pulse 101   Ht 154.9 cm (61\")   Wt 108 kg (237 lb)   SpO2 98%   BMI 44.78 kg/m²   Estimated body mass index is 44.78 kg/m² as calculated from the following:    Height as of this encounter: 154.9 cm (61\").    Weight as of this encounter: 108 kg (237 lb).    Physical Exam  Vitals reviewed.   Constitutional:       Appearance: Normal appearance. She is obese.   Cardiovascular:      Rate and Rhythm: Regular rhythm. Tachycardia present.      Heart sounds: Normal heart sounds.   Pulmonary:      Effort: Pulmonary effort is normal.      Breath sounds: Normal breath sounds.   Skin:     General: Skin is warm and dry.      Capillary Refill: Capillary refill takes less than 2 seconds.   Neurological:      General: No focal deficit present.      Mental Status: She is alert and oriented to person, place, and time.   Psychiatric:         Mood and Affect: Mood normal.         Behavior: Behavior normal.          POCT Results (if applicable):  Results for orders placed or performed in visit on 24   POC Urine Drug Screen [facility]    Specimen: Urine   Result Value Ref Range    Amphetamine Screen, Urine Negative Negative    AMP INTERNAL CONTROL Passed Passed    Barbiturates Screen, Urine Negative Negative    BARBITURATE INTERNAL CONTROL Passed Passed    Buprenorphine, Screen, Urine Negative Negative    BUPRENORPHINE INTERNAL CONTROL Passed Passed    " Benzodiazepine Screen, Urine Negative Negative    BENZODIAZEPINE INTERNAL CONTROL Passed Passed    Cocaine Screen, Urine Negative Negative    COCAINE INTERNAL CONTROL Passed Passed    MDMA (ECSTASY) Negative Negative    MDMA (ECSTASY) INTERNAL CONTROL Passed Passed    Methamphetamine, Ur Negative Negative    METHAMPHETAMINE INTERNAL CONTROL Passed Passed    Methadone Screen, Urine Negative Negative    METHADONE INTERNAL CONTROL Passed Passed    Opiate Screen Negative Negative    OPIATES INTERNAL CONTROL Passed Passed    Oxycodone Screen, Urine Negative Negative    OXYCODONE INTERNAL CONTROL Passed Passed    Phencyclidine (PCP), Urine Negative Negative    PHENCYCLIDINE INTERNAL CONTROL Passed Passed    THC, Screen, Urine Negative Negative    THC INTERNAL CONTROL Passed Passed    Lot Number [id number]     Expiration Date 9/27/25             Assessment and Plan     Diagnoses and all orders for this visit:    1. Generalized anxiety disorder with panic attacks (Primary)  Assessment & Plan:  Psychological condition is improving with treatment.  Continue current treatment regimen.  Regular aerobic exercise.  Psychological condition  will be reassessed in 6 months.    Patient reports she is doing very well, anxiety is well-controlled since starting Lexapro. She is very exctied, has seen significant improvements in her daily life and job performance. She denies need to increase dose, wants to continue at current dose. Refills provided    Orders:  -     escitalopram (Lexapro) 10 MG tablet; Take 1 tablet by mouth Daily.  Dispense: 90 tablet; Refill: 1    2. Morbid (severe) obesity due to excess calories  Assessment & Plan:  Patient's (Body mass index is 44.78 kg/m².) indicates that they are morbidly/severely obese (BMI > 40 or > 35 with obesity - related health condition) with health conditions that include none . Weight is worsening. BMI  is above average; BMI management plan is completed. We discussed portion control,  increasing exercise, and pharmacologic options including Phentermine .     Patient would like to initiate medication to assist with weight loss. 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 She would like to initiate Phentermine. We discussed risks/benefits and possible side effects. Discussed limitations on prescribing. Discussed and encouraged healthy diet and exercise routine.     Orders:  -     POC Urine Drug Screen [facility]  -     phentermine 37.5 MG capsule; Take 1 capsule by mouth Every Morning.  Dispense: 30 capsule; Refill: 0    3. Controlled substance agreement signed  Assessment & Plan:  Controlled substance agreement signed, urine sample collected for UDS - negative.     Orders:  -     POC Urine Drug Screen [facility]         Follow Up  Return in about 4 weeks (around 5/24/2024) for Recheck.    LEONARDO Espinoza

## 2024-04-26 NOTE — PROGRESS NOTES
Chief Complaint   Patient presents with    Follow-up    Menorrhagia    Dysmenorrhea         Subjective   HPI  Yessenia Gill is a 19 y.o. female, , her last LMP was Patient's last menstrual period was 2024 (exact date)..  She presents for a follow up evaluation of Menorrhagia and Dysmenorrhea. The patient was last seen  2 weeks ago by LEONARDO Wood. At that time she reported moderate-heavy flow during period and moderate-severe dysmenorrhea. The plan was expectant management. Since the last visit the patient reports the plan has remained unchanged. This has been an issue in the past. The patient reports additional symptoms as none.      US was done today.       Additional OB/GYN History   Last Pap : 24  Last Completed Pap Smear            PAP SMEAR (Every 3 Years) Next due on 2024  LIQUID-BASED PAP SMEAR WITH HPV GENOTYPING IF ASCUS ([facility]ELITA,[facility],MAD)                  History of abnormal Pap smear: yes - ASCUS, HPV neg  Tobacco Usage?: No       Current Outpatient Medications:     escitalopram (Lexapro) 10 MG tablet, Take 1 tablet by mouth Daily., Disp: 90 tablet, Rfl: 1    hydrOXYzine (ATARAX) 25 MG tablet, Take 1 tablet by mouth 3 (Three) Times a Day As Needed for Anxiety., Disp: 30 tablet, Rfl: 1    vitamin D3 (Vitamin D) 125 MCG (5000 UT) capsule capsule, Take 1 capsule by mouth Daily., Disp: 30 capsule, Rfl: 11    phentermine 37.5 MG capsule, Take 1 capsule by mouth Every Morning. (Patient not taking: Reported on 2024), Disp: 30 capsule, Rfl: 0     Past Medical History:   Diagnosis Date    Anxiety     Asthma     Depression     Migraine     PMS (premenstrual syndrome)     Urinary tract infection         History reviewed. No pertinent surgical history.    The additional following portions of the patient's history were reviewed and updated as appropriate: allergies, current medications, past family history, past medical history, past social history, past surgical  Phone call to patient to convey results.  Results conveyed.  Patient verbalizes understanding.  No further questions or concerns at this time.   "history, and problem list.    Review of Systems   Constitutional: Negative.    HENT: Negative.     Eyes: Negative.    Respiratory: Negative.     Cardiovascular: Negative.    Gastrointestinal: Negative.    Endocrine: Negative.    Genitourinary:  Positive for menstrual problem.   Musculoskeletal: Negative.    Skin: Negative.    Allergic/Immunologic: Negative.    Neurological: Negative.    Hematological: Negative.    Psychiatric/Behavioral: Negative.         I have reviewed and agree with the HPI, ROS, and historical information as entered above. Peggy Golden, APRN      Objective   /68   Ht 154.9 cm (61\")   Wt 108 kg (239 lb)   LMP 04/23/2024 (Exact Date)   BMI 45.16 kg/m²     Physical Exam  Vitals and nursing note reviewed.   Constitutional:       General: She is not in acute distress.     Appearance: Normal appearance. She is not ill-appearing.   Pulmonary:      Effort: Pulmonary effort is normal. No respiratory distress.   Skin:     General: Skin is warm and dry.   Neurological:      Mental Status: She is alert and oriented to person, place, and time.   Psychiatric:         Mood and Affect: Mood normal.         Behavior: Behavior normal.         Assessment & Plan     Assessment     Problem List Items Addressed This Visit    None  Visit Diagnoses       Encounter for pregnancy test, result unknown    -  Primary    Relevant Orders    POC Pregnancy, Urine (Completed)    Menorrhagia with regular cycle        Relevant Orders    US Non-ob Transvaginal    Dysmenorrhea        Relevant Orders    US Non-ob Transvaginal              Plan     D/w pt 2 cm area in uterus--- polyp vs clot.  Nexstellis samples x 2 given.  Start now.  UPT neg.  RTO 6 wks US FU.  Will rev US with MD and contact her prn with new recommendations.      Peggy Golden, APRN  04/26/2024   "

## 2024-04-26 NOTE — ASSESSMENT & PLAN NOTE
Patient's (Body mass index is 44.78 kg/m².) indicates that they are morbidly/severely obese (BMI > 40 or > 35 with obesity - related health condition) with health conditions that include none . Weight is worsening. BMI  is above average; BMI management plan is completed. We discussed portion control, increasing exercise, and pharmacologic options including Phentermine .     Patient would like to initiate medication to assist with weight loss. 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 She would like to initiate Phentermine. We discussed risks/benefits and possible side effects. Discussed limitations on prescribing. Discussed and encouraged healthy diet and exercise routine.

## 2024-05-02 ENCOUNTER — TELEPHONE (OUTPATIENT)
Dept: OBSTETRICS AND GYNECOLOGY | Facility: CLINIC | Age: 19
End: 2024-05-02
Payer: COMMERCIAL

## 2024-05-02 NOTE — TELEPHONE ENCOUNTER
Patient pharmacy is calling because they need the RX for Naproxen to say say PRN or 1 tablet every 12 hours.

## 2024-05-06 ENCOUNTER — TELEPHONE (OUTPATIENT)
Dept: OBSTETRICS AND GYNECOLOGY | Facility: CLINIC | Age: 19
End: 2024-05-06
Payer: COMMERCIAL

## 2024-07-02 ENCOUNTER — OFFICE VISIT (OUTPATIENT)
Dept: OBSTETRICS AND GYNECOLOGY | Facility: CLINIC | Age: 19
End: 2024-07-02
Payer: COMMERCIAL

## 2024-07-02 VITALS
DIASTOLIC BLOOD PRESSURE: 70 MMHG | HEIGHT: 61 IN | WEIGHT: 242 LBS | BODY MASS INDEX: 45.69 KG/M2 | SYSTOLIC BLOOD PRESSURE: 118 MMHG

## 2024-07-02 DIAGNOSIS — N94.6 DYSMENORRHEA: ICD-10-CM

## 2024-07-02 DIAGNOSIS — N92.0 MENORRHAGIA WITH REGULAR CYCLE: Primary | ICD-10-CM

## 2024-07-02 NOTE — PROGRESS NOTES
Chief Complaint   Patient presents with    Menorrhagia     Follow up         Subjective   HPI  Yessenia Gill is a 19 y.o. female, ,  Patient's last menstrual period was 2024 (approximate)..  She presents for a follow up evaluation of Menorrhagia and Dysmenorrhea. The patient was last seen  2024  ago by LEONARDO Wood.   At that time she reported mod-heavy flow and mod-severe dysmenorrhea. She had and ultrasound that showed uterine polyp.  The plan was to take a few packs of OCPs and repeat US.  She took one pack of pills and spotted x 2 wks.  She stopped taking them and she has not had a period since.  Her next period is due soon.  She was happy that she had no pain while she was bleeding.      US was done today.       Additional OB/GYN History   Last Pap :   Last Completed Pap Smear            PAP SMEAR (Every 3 Years) Next due on 2024  LIQUID-BASED PAP SMEAR WITH HPV GENOTYPING IF ASCUS (ANGELITA,COR,MAD)                  History of abnormal Pap smear: yes -  +ASC/HPV neg/GCT neg  Tobacco Usage?: No       Current Outpatient Medications:     escitalopram (Lexapro) 10 MG tablet, Take 1 tablet by mouth Daily., Disp: 90 tablet, Rfl: 1    hydrOXYzine (ATARAX) 25 MG tablet, Take 1 tablet by mouth 3 (Three) Times a Day As Needed for Anxiety., Disp: 30 tablet, Rfl: 1    vitamin D3 (Vitamin D) 125 MCG (5000 UT) capsule capsule, Take 1 capsule by mouth Daily., Disp: 30 capsule, Rfl: 11    Drospirenone-Estetrol (NEXTSTELLIS PO), Take  by mouth. (Patient not taking: Reported on 2024), Disp: , Rfl:     naproxen sodium (Anaprox DS) 550 MG tablet, Every 12 hours as needed (Patient not taking: Reported on 2024), Disp: 60 tablet, Rfl: 1    phentermine 37.5 MG capsule, Take 1 capsule by mouth Every Morning. (Patient not taking: Reported on 2024), Disp: 30 capsule, Rfl: 0     Past Medical History:   Diagnosis Date    Anxiety     Asthma     Depression     Migraine      "PMS (premenstrual syndrome)     Urinary tract infection         History reviewed. No pertinent surgical history.    The additional following portions of the patient's history were reviewed and updated as appropriate: allergies, current medications, past medical history, past social history, past surgical history, and problem list.    Review of Systems   Constitutional: Negative.    HENT: Negative.     Eyes: Negative.    Respiratory: Negative.     Cardiovascular: Negative.    Gastrointestinal: Negative.    Endocrine: Negative.    Genitourinary:  Positive for menstrual problem.   Musculoskeletal: Negative.    Skin: Negative.    Allergic/Immunologic: Negative.    Neurological: Negative.    Hematological: Negative.    Psychiatric/Behavioral: Negative.         I have reviewed and agree with the HPI, ROS, and historical information as entered above. Peggy Golden, APRN      Objective   /70   Ht 154.9 cm (61\")   Wt 110 kg (242 lb)   LMP 05/26/2024 (Approximate)   BMI 45.73 kg/m²     Physical Exam  Vitals and nursing note reviewed.   Constitutional:       General: She is not in acute distress.     Appearance: Normal appearance. She is not ill-appearing.   Pulmonary:      Effort: Pulmonary effort is normal. No respiratory distress.   Skin:     General: Skin is warm and dry.   Neurological:      Mental Status: She is alert and oriented to person, place, and time.   Psychiatric:         Mood and Affect: Mood normal.         Behavior: Behavior normal.         Assessment & Plan     Assessment     Problem List Items Addressed This Visit    None  Visit Diagnoses       Menorrhagia with regular cycle    -  Primary    Relevant Orders    US Non-ob Transvaginal    Dysmenorrhea        Relevant Orders    US Non-ob Transvaginal              Plan     D/w pt today's US shows suspected polyp still present.  She will restart Nextstellis samples as directed and RTO for US in 2 months.  Call prn worsening symptoms.      Peggy Golden, " APRN  07/02/2024

## 2024-07-16 DIAGNOSIS — Z3A.01 LESS THAN 8 WEEKS GESTATION OF PREGNANCY: Primary | ICD-10-CM

## 2024-07-17 LAB — HCG INTACT+B SERPL-ACNC: 56 MIU/ML

## 2024-07-22 DIAGNOSIS — Z3A.01 LESS THAN 8 WEEKS GESTATION OF PREGNANCY: Primary | ICD-10-CM

## 2024-07-23 LAB
HCG INTACT+B SERPL-ACNC: 944 MIU/ML
Lab: NORMAL

## 2024-10-21 ENCOUNTER — OFFICE VISIT (OUTPATIENT)
Dept: FAMILY MEDICINE CLINIC | Facility: CLINIC | Age: 19
End: 2024-10-21
Payer: COMMERCIAL

## 2024-10-21 VITALS
OXYGEN SATURATION: 98 % | TEMPERATURE: 99.1 F | SYSTOLIC BLOOD PRESSURE: 122 MMHG | HEIGHT: 61 IN | HEART RATE: 103 BPM | WEIGHT: 239 LBS | DIASTOLIC BLOOD PRESSURE: 84 MMHG | BODY MASS INDEX: 45.12 KG/M2

## 2024-10-21 DIAGNOSIS — R30.0 DYSURIA: Primary | ICD-10-CM

## 2024-10-21 LAB
BILIRUB BLD-MCNC: NEGATIVE MG/DL
CLARITY, POC: CLEAR
COLOR UR: YELLOW
EXPIRATION DATE: NORMAL
GLUCOSE UR STRIP-MCNC: NEGATIVE MG/DL
KETONES UR QL: NEGATIVE
LEUKOCYTE EST, POC: NEGATIVE
Lab: NORMAL
NITRITE UR-MCNC: NEGATIVE MG/ML
PH UR: 5.5 [PH] (ref 5–8)
PROT UR STRIP-MCNC: NEGATIVE MG/DL
RBC # UR STRIP: NEGATIVE /UL
SP GR UR: 1.02 (ref 1–1.03)
UROBILINOGEN UR QL: NORMAL

## 2024-10-21 PROCEDURE — 81003 URINALYSIS AUTO W/O SCOPE: CPT | Performed by: NURSE PRACTITIONER

## 2024-10-21 PROCEDURE — 99213 OFFICE O/P EST LOW 20 MIN: CPT | Performed by: NURSE PRACTITIONER

## 2024-10-21 NOTE — PROGRESS NOTES
"Chief Complaint  Urinary Tract Infection (Pt is here for UTI sxs onset 1 day. )    Subjective          Yessenia Gill presents to [facility]  History of Present Illness  Pt has had burning with urination since this morning. She denies back pain or abdominal pain. She feels that she is not emptying her bladder well at times. She denies exposure to STD or vaginal itching.   Urinary Tract Infection   Pertinent negatives include no frequency or urgency.       History of Present Illness      Objective   Vital Signs:   /84   Pulse 103   Temp 99.1 °F (37.3 °C) (Oral)   Ht 154.9 cm (61\")   Wt 108 kg (239 lb)   SpO2 98%   BMI 45.16 kg/m²     Body mass index is 45.16 kg/m².    Review of Systems   Constitutional:  Negative for fatigue and fever.   Respiratory:  Negative for shortness of breath.    Cardiovascular:  Negative for chest pain, palpitations and leg swelling.   Genitourinary:  Positive for dysuria. Negative for frequency and urgency.   Neurological:  Negative for syncope.   Psychiatric/Behavioral:  The patient is not nervous/anxious.         No current outpatient medications on file.      Allergies: Patient has no known allergies.    Physical Exam  Constitutional:       Appearance: Normal appearance.   HENT:      Head: Normocephalic.   Eyes:      Conjunctiva/sclera: Conjunctivae normal.      Pupils: Pupils are equal, round, and reactive to light.   Cardiovascular:      Rate and Rhythm: Normal rate and regular rhythm.      Heart sounds: Normal heart sounds.   Pulmonary:      Effort: Pulmonary effort is normal.      Breath sounds: Normal breath sounds.   Abdominal:      Tenderness: There is no abdominal tenderness.   Musculoskeletal:         General: Normal range of motion.   Skin:     General: Skin is warm and dry.      Capillary Refill: Capillary refill takes less than 2 seconds.   Neurological:      General: No focal deficit present.      Mental Status: She is alert and " oriented to person, place, and time.   Psychiatric:         Mood and Affect: Mood normal.         Behavior: Behavior normal.         Thought Content: Thought content normal.         Judgment: Judgment normal.          Physical Exam         Result Review :                Results            Assessment and Plan    Diagnoses and all orders for this visit:    1. Dysuria (Primary)  Comments:  Increase fluids and void often. UA done and culture pending. RTC for worsened sx and if not improving in 48 hours.  Orders:  -     POC Urinalysis Dipstick, Automated  -     Urine Culture - Urine, Urine, Clean Catch        Assessment & Plan                   Follow Up   Return in about 1 week (around 10/28/2024) for if not improving or sooner if symptoms worsen.  Patient was given instructions and counseling regarding her condition or for health maintenance advice. Please see specific information pulled into the AVS if appropriate.     LEONARDO Cates

## 2024-10-23 LAB
BACTERIA UR CULT: NORMAL
BACTERIA UR CULT: NORMAL

## 2024-11-22 ENCOUNTER — FLU SHOT (OUTPATIENT)
Dept: FAMILY MEDICINE CLINIC | Facility: CLINIC | Age: 19
End: 2024-11-22
Payer: COMMERCIAL

## 2024-11-22 DIAGNOSIS — Z23 FLU VACCINE NEED: Primary | ICD-10-CM

## 2025-01-03 ENCOUNTER — OFFICE VISIT (OUTPATIENT)
Dept: OBSTETRICS AND GYNECOLOGY | Facility: CLINIC | Age: 20
End: 2025-01-03
Payer: COMMERCIAL

## 2025-01-03 VITALS
BODY MASS INDEX: 45.12 KG/M2 | SYSTOLIC BLOOD PRESSURE: 122 MMHG | HEIGHT: 61 IN | DIASTOLIC BLOOD PRESSURE: 80 MMHG | WEIGHT: 239 LBS

## 2025-01-03 DIAGNOSIS — O03.9 MISCARRIAGE: Primary | ICD-10-CM

## 2025-01-03 DIAGNOSIS — N96 RECURRENT PREGNANCY LOSS: ICD-10-CM

## 2025-01-03 DIAGNOSIS — E66.01 MORBID OBESITY WITH BMI OF 45.0-49.9, ADULT: ICD-10-CM

## 2025-01-03 DIAGNOSIS — N93.9 VAGINAL BLEEDING: ICD-10-CM

## 2025-01-03 RX ORDER — PRENATAL VIT NO.126/IRON/FOLIC 28MG-0.8MG
TABLET ORAL DAILY
COMMUNITY

## 2025-01-03 RX ORDER — ACETAMINOPHEN 500 MG
500 TABLET ORAL EVERY 6 HOURS PRN
COMMUNITY

## 2025-01-03 NOTE — PROGRESS NOTES
"    Chief Complaint   Patient presents with    Threatened Miscarriage          HPI  Yessenia Gill is a 19 y.o. female, , Patient's last menstrual period was 10/26/2024 (exact date). who presents with bleeding.  The amount of bleeding is described as heavy and moderate for 4 days with clots starting ., cramping, U/S without fetal heart tones, and U/S with gestational sac only.     Pt had MAB in 2024, she passed on her own for which she saw [facility].     Recent Tests:  She  had a positive preg test on 2024 .    US today: Yes.  Findings showed no evidence of pregnancy..  I have personally evaluated the U/S and agree with the findings. Nora Bell MD  She has not had prenatal care.  She complains of cramping pain.  The pain is located in her lower abdomen.. Her past medical history is notable for spontaneous .  Pt states she does have passage of tissue.  Rh Status: Unknown  She reports no additional symptoms or complaints.    The additional following portions of the patient's history were reviewed and updated as appropriate: allergies, current medications, past family history, past medical history, past social history, past surgical history, and problem list.    Review of Systems   Constitutional: Negative.    HENT: Negative.     Eyes: Negative.    Respiratory: Negative.     Cardiovascular: Negative.    Gastrointestinal: Negative.    Endocrine: Negative.    Genitourinary:  Positive for pelvic pain and vaginal bleeding.   Musculoskeletal: Negative.    Skin: Negative.    Allergic/Immunologic: Negative.    Neurological: Negative.    Hematological: Negative.    Psychiatric/Behavioral: Negative.       All other systems reviewed and are negative.     I have reviewed and agree with the HPI, ROS, and historical information as entered above. Nora Bell MD      Objective   /80   Ht 154.9 cm (61\")   Wt 108 kg (239 lb)   LMP 10/26/2024 (Exact Date)   BMI 45.16 kg/m² "     Physical Exam  Vitals and nursing note reviewed.   Constitutional:       Appearance: Normal appearance. She is well-developed.   HENT:      Head: Normocephalic and atraumatic.   Pulmonary:      Effort: Pulmonary effort is normal.      Breath sounds: Normal breath sounds.   Abdominal:      General: There is no distension.      Palpations: Abdomen is soft. Abdomen is not rigid. There is no mass.      Tenderness: There is no abdominal tenderness. There is no guarding or rebound.   Musculoskeletal:      Cervical back: Normal range of motion.   Skin:     General: Skin is warm and dry.   Neurological:      Mental Status: She is alert and oriented to person, place, and time.   Psychiatric:         Mood and Affect: Mood normal.         Behavior: Behavior normal.            Assessment and Plan    Problem List Items Addressed This Visit    None  Visit Diagnoses       Miscarriage    -  Primary    Relevant Orders    HCG, B-subunit, Quantitative    Vaginal bleeding        Relevant Orders    ABO / Rh    Morbid obesity with BMI of 45.0-49.9, adult        Relevant Orders    TSH    Hemoglobin A1c    Recurrent pregnancy loss        Relevant Orders    TSH    Hemoglobin A1c            Complete AB  Patient appears to have passed all tissue.  Will draw labs today.  Will check Rh status as patient does not know.  We reviewed that she may need RhoGAM.  Discussed that this is potentially patient's second complete AB that is spontaneous.  She reports it happened once before.  This occurred with [facility].  No evidence of pregnancy on either occurrence.  Return in about 3 months (around 4/3/2025) for Annual physical.    Total time spent today with Yessenia  was 30 minutes (level 4).  Off this time, > 50% was spent face-to-face time coordinating care, answering her questions and counseling regarding pathophysiology of her presenting problem along with plans for any diagnositc work-up and treatment.        Nora Bell,  MD  01/03/2025

## 2025-01-04 LAB
ABO GROUP BLD: NORMAL
HBA1C MFR BLD: 5.6 % (ref 4.8–5.6)
HCG INTACT+B SERPL-ACNC: 566 MIU/ML
RH BLD: POSITIVE
TSH SERPL DL<=0.005 MIU/L-ACNC: 1.64 UIU/ML (ref 0.27–4.2)